# Patient Record
Sex: MALE | Race: WHITE
[De-identification: names, ages, dates, MRNs, and addresses within clinical notes are randomized per-mention and may not be internally consistent; named-entity substitution may affect disease eponyms.]

---

## 2018-04-21 ENCOUNTER — HOSPITAL ENCOUNTER (EMERGENCY)
Dept: HOSPITAL 56 - MW.ED | Age: 18
LOS: 1 days | Discharge: HOME | End: 2018-04-22
Payer: COMMERCIAL

## 2018-04-21 DIAGNOSIS — S99.911A: ICD-10-CM
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
## 2018-04-21 DIAGNOSIS — Z87.891: ICD-10-CM
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
## 2018-04-21 DIAGNOSIS — S99.921A: Primary | ICD-10-CM

## 2018-04-22 NOTE — EDM.PDOC
ED HPI GENERAL MEDICAL PROBLEM





- General


Chief Complaint: Lower Extremity Injury/Pain


Stated Complaint: RT ANKLE


Time Seen by Provider: 04/22/18 00:59


Source of Information: Reports: Patient





- History of Present Illness


INITIAL COMMENTS - FREE TEXT/NARRATIVE: 





HISTORY AND PHYSICAL:


History of present illness:


[Patient presents with right ankle pain 8 out of 10  unable to bear weight and 

moderate swelling of the ankle bruising and swelling lateral malleolus as well 

as medial no fever nausea vomiting chills sweats no head injury or loss of 

consciousness no chest pain shortness breath headache dizziness palpitation no 

bowel or urine symptoms





Patient was working on an [place] and his ankle became entrapped between a 

piece of metal and a rotor table which weighs approximately 7000 pounds, there 

is no obvious deformity however x-ray reveals a osseous density adjacent to the 

medial talus





Review of systems: 


As per history of present illness and below otherwise all systems reviewed and 

negative.


Past medical history: 


As per history of present illness and as reviewed below otherwise 

noncontributory.


Surgical history: 


As per history of present illness and as reviewed below otherwise 

noncontributory.


Social history: 


No reported history of drug or alcohol abuse.


Family history: 


As per history of present illness and as reviewed below otherwise 

noncontributory.





Physical exam:


HEENT: Atraumatic, normocephalic, pupils reactive, negative for conjunctival 

pallor or scleral icterus, mucous membranes moist, throat clear, neck supple, 

nontender, trachea midline.


Lungs: Clear to auscultation, breath sounds equal bilaterally, chest nontender.


Heart: S1S2, regular, negative for clicks, rubs, or JVD.


Abdomen: Soft, nondistended, nontender. Negative for masses or 

hepatosplenomegaly. Negative for costovertebral tenderness.


Pelvis: Stable nontender.


Genitourinary: Deferred.


Rectal: Deferred.


Extremities: Atraumatic, negative for cords or calf pain. Neurovascular 

unremarkable.


Right lower extremity hip knee unaffected ankle moderate swelling with bruising 

lateral and medial as well as tenderness, neurovascular is intact


Neuro: Awake, alert, oriented. Cranial nerves II through XII unremarkable. 

Cerebellum unremarkable. Motor and sensory unremarkable throughout. Exam 

nonfocal.





Diagnostics:


[Right ankle complete


]


Therapeutics:


[Splint


Crutches


Nonweightbearing


Follow-up with orthopedist ER referral provided


]Light duty nonweightbearing right lower extremity





Impression: 


Right foot/ankle injury ]


Definitive disposition and diagnosis as appropriate pending reevaluation and 

review of above.


  ** Right Ankle


Pain Score (Numeric/FACES): 8





- Related Data


 Allergies











Allergy/AdvReac Type Severity Reaction Status Date / Time


 


No Known Allergies Allergy   Verified 04/21/18 22:50











Home Meds: 


 Home Meds





. [No Known Home Meds]  04/21/18 [History]











Past Medical History





- Past Health History


Medical/Surgical History: Denies Medical/Surgical History


Other Dermatologic History: Ganglion cyst right hand-removed





Social & Family History





- Tobacco Use


Smoking Status *Q: Former Smoker


Used Tobacco, but Quit: Yes


Month/Year Tobacco Last Used: 2/14/17


Second Hand Smoke Exposure: No





- Caffeine Use


Caffeine Use: Reports: Energy Drinks


Other Caffeine Use: HAs an energy drink about once a week





- Recreational Drug Use


Recreational Drug Use: No





Review of Systems





- Review of Systems


Review Of Systems: ROS reveals no pertinent complaints other than HPI.





ED EXAM, GENERAL





- Physical Exam


Exam: See Below





Course





- Vital Signs


Last Recorded V/S: 


 Last Vital Signs











Temp  99.0 F   04/21/18 22:41


 


Pulse  68   04/22/18 01:38


 


Resp  12   04/22/18 01:38


 


BP  128/72   04/22/18 01:38


 


Pulse Ox  99   04/22/18 01:38














- Orders/Labs/Meds


Orders: 


 Active Orders 24 hr











 Category Date Time Status


 


 Ankle Min 3V Rt [CR] Stat Exams  04/21/18 22:51 Taken


 


 Foot Comp Min 3V Rt [CR] Stat Exams  04/22/18 01:06 Taken














Departure





- Departure


Time of Disposition: 01:59


Disposition: Home, Self-Care 01


Condition: Good


Clinical Impression: 


 Right ankle injury, Right foot injury








- Discharge Information


Referrals: 


PCP,None [Primary Care Provider] - 


Forms:  ED Department Discharge


Additional Instructions: 


Ibuprofen 800 mg 3 times daily 7-10 days


Ice 20 minute intervals 3 times daily


Cam  boot/crutches/nonweightbearing right lower extremity


Elevate foot while at rest


ER referral to orthopedist for Monday or Tuesday evaluate and treat


Light work duty nonweightbearing on right lower extremity, Until orthopedic 

follow-up for further work restriction or clearance





[facility]


[address]


[address] [place] [address], [address]


[place] [address]


Phone: [phone]


Fax: [phone] my orthopedic





The following information is given to patients seen in the emergency department 

who are being discharged to home. This information is to outline your options 

for follow-up care. We provide all patients seen in our emergency department 

with a follow-up referral.





The need for follow-up, as well as the timing and circumstances, are variable 

depending upon the specifics of your emergency department visit.





If you don't have a primary care physician on staff, we will provide you with a 

referral. We always advise you to contact your personal physician following an 

emergency department visit to inform them of the circumstance of the visit and 

for follow-up with them and/or the need for any referrals to a consulting 

specialist.





The emergency department will also refer you to a specialist when appropriate. 

This referral assures that you have the opportunity for follow-up care with a 

specialist. All of these measure are taken in an effort to provide you with 

optimal care, which includes your follow-up.





Under all circumstances we always encourage you to contact your private 

physician who remains a resource for coordinating your care. When calling for 

follow-up care, please make the office aware that this follow-up is from your 

recent emergency room visit. If for any reason you are refused follow-up, 

please contact the [facility] emergency department at [phone] 

and asked to speak to the emergency department charge nurse.








- My Orders


Last 24 Hours: 


My Active Orders





04/21/18 22:51


Ankle Min 3V Rt [CR] Stat 





04/22/18 01:06


Foot Comp Min 3V Rt [CR] Stat 














- Assessment/Plan


Last 24 Hours: 


My Active Orders





04/21/18 22:51


Ankle Min 3V Rt [CR] Stat 





04/22/18 01:06


Foot Comp Min 3V Rt [CR] Stat

## 2018-04-23 NOTE — CR
EXAM DATE: 18



PATIENT'S AGE: 18





Patient: TYE GUERRIER



Facility: [place], [place]

Patient ID: [id number]

Site Patient ID: [id number].

Site Accession #: [id number]

: 2000

Study: XRay Extremity Right [id number]/21/2018 11:11:12 PM

Ordering Physician: Patric Arauz



Final Report: 

INDICATION:

Trauma. 



TECHNIQUE:

Three views right ankle



COMPARISON:

None 



FINDINGS:

Bones: 6 millimeter well corticated osseous density adjacent to the medial 
talus. Correlate with point tenderness. 

Joint spaces: Unremarkable. 

Soft tissues: Diffuse ankle edema. 



IMPRESSION:

6 millimeter well corticated appearing osseous density adjacent to the medial 
talus. Correlate with point tenderness.

Diffuse ankle edema.



Dictated by Dago Garcia MD @ 2018 11:28:32 PM





Dictated by: Dago Garcia MD @ 2018 23:28:36

(Electronic Signature)



Report Signed by Proxy.
OLU

## 2018-04-23 NOTE — CR
EXAM DATE: 18



PATIENT'S AGE: 18





Patient: TYE GUERRIER



Facility: [place], [place]

Patient ID: [id number]

Site Patient ID: [id number].

Site Accession #: [id number]

: 2000

Study: XRay Extremity Right [id number]/22/2018 1:33:38 AM

Ordering Physician: Patric Arauz



Final Report: 

INDICATION:

From 



TECHNIQUE:

Three views right foot 



COMPARISON:

None 



FINDINGS:

Bones: Linear lucency involving the base of the 3rd metatarsal bone on the 
oblique view only. 

Joint spaces: Unremarkable. 

Soft tissues: Soft tissue edema dorsal to the talus. 



IMPRESSION:

Linear lucency along the base of 3rd metatarsal bone on the oblique view only. 
Correlate with point tenderness.

Soft tissue edema dorsal to the talus.



Dictated by Dago Garcia MD @ 2018 1:42:14 AM





Dictated by: Dago Garcia MD @ 2018 01:42:20

(Electronic Signature)



Report Signed by Proxy.
OLU